# Patient Record
Sex: FEMALE | Race: WHITE | URBAN - METROPOLITAN AREA
[De-identification: names, ages, dates, MRNs, and addresses within clinical notes are randomized per-mention and may not be internally consistent; named-entity substitution may affect disease eponyms.]

---

## 2017-03-01 ENCOUNTER — TELEPHONE (OUTPATIENT)
Dept: INTERNAL MEDICINE CLINIC | Age: 62
End: 2017-03-01

## 2017-04-17 ENCOUNTER — OFFICE VISIT (OUTPATIENT)
Dept: INTERNAL MEDICINE CLINIC | Age: 62
End: 2017-04-17

## 2017-04-17 VITALS
RESPIRATION RATE: 12 BRPM | SYSTOLIC BLOOD PRESSURE: 136 MMHG | HEIGHT: 67 IN | BODY MASS INDEX: 36.88 KG/M2 | DIASTOLIC BLOOD PRESSURE: 86 MMHG | HEART RATE: 96 BPM | WEIGHT: 235 LBS

## 2017-04-17 DIAGNOSIS — L82.1 SEBORRHEIC KERATOSES: ICD-10-CM

## 2017-04-17 DIAGNOSIS — Z80.0 FAMILY HISTORY OF COLON CANCER: ICD-10-CM

## 2017-04-17 DIAGNOSIS — E78.5 HYPERLIPIDEMIA, UNSPECIFIED HYPERLIPIDEMIA TYPE: ICD-10-CM

## 2017-04-17 DIAGNOSIS — Z12.39 SCREENING FOR BREAST CANCER: ICD-10-CM

## 2017-04-17 DIAGNOSIS — D22.9 ATYPICAL NEVI: ICD-10-CM

## 2017-04-17 DIAGNOSIS — R73.01 ELEVATED FASTING BLOOD SUGAR: ICD-10-CM

## 2017-04-17 DIAGNOSIS — R73.09 ELEVATED GLUCOSE: Primary | ICD-10-CM

## 2017-04-17 DIAGNOSIS — K63.5 COLORECTAL POLYPS: ICD-10-CM

## 2017-04-17 DIAGNOSIS — Z00.00 HEALTH CARE MAINTENANCE: ICD-10-CM

## 2017-04-17 DIAGNOSIS — K21.9 GASTROESOPHAGEAL REFLUX DISEASE, ESOPHAGITIS PRESENCE NOT SPECIFIED: ICD-10-CM

## 2017-04-17 DIAGNOSIS — K62.1 COLORECTAL POLYPS: ICD-10-CM

## 2017-04-17 DIAGNOSIS — R31.1 BENIGN MICROSCOPIC HEMATURIA: ICD-10-CM

## 2017-04-17 DIAGNOSIS — Z85.3 HX: BREAST CANCER: ICD-10-CM

## 2017-04-17 DIAGNOSIS — R73.09 ELEVATED GLUCOSE: ICD-10-CM

## 2017-04-17 LAB
A/G RATIO: 1.4 (ref 1.1–2.2)
ALBUMIN SERPL-MCNC: 4 G/DL (ref 3.4–5)
ALP BLD-CCNC: 89 U/L (ref 40–129)
ALT SERPL-CCNC: 11 U/L (ref 10–40)
ANION GAP SERPL CALCULATED.3IONS-SCNC: 17 MMOL/L (ref 3–16)
AST SERPL-CCNC: 12 U/L (ref 15–37)
BASOPHILS ABSOLUTE: 0 K/UL (ref 0–0.2)
BASOPHILS RELATIVE PERCENT: 0.6 %
BILIRUB SERPL-MCNC: 0.3 MG/DL (ref 0–1)
BUN BLDV-MCNC: 16 MG/DL (ref 7–20)
CALCIUM SERPL-MCNC: 9.4 MG/DL (ref 8.3–10.6)
CHLORIDE BLD-SCNC: 100 MMOL/L (ref 99–110)
CHOLESTEROL, TOTAL: 223 MG/DL (ref 0–199)
CO2: 25 MMOL/L (ref 21–32)
CREAT SERPL-MCNC: 0.6 MG/DL (ref 0.6–1.2)
EOSINOPHILS ABSOLUTE: 0.1 K/UL (ref 0–0.6)
EOSINOPHILS RELATIVE PERCENT: 1.8 %
GFR AFRICAN AMERICAN: >60
GFR NON-AFRICAN AMERICAN: >60
GLOBULIN: 2.9 G/DL
GLUCOSE BLD-MCNC: 94 MG/DL (ref 70–99)
HCT VFR BLD CALC: 41.6 % (ref 36–48)
HDLC SERPL-MCNC: 56 MG/DL (ref 40–60)
HEMOGLOBIN: 13.6 G/DL (ref 12–16)
LDL CHOLESTEROL CALCULATED: 157 MG/DL
LYMPHOCYTES ABSOLUTE: 1.3 K/UL (ref 1–5.1)
LYMPHOCYTES RELATIVE PERCENT: 16.5 %
MCH RBC QN AUTO: 28.3 PG (ref 26–34)
MCHC RBC AUTO-ENTMCNC: 32.8 G/DL (ref 31–36)
MCV RBC AUTO: 86.2 FL (ref 80–100)
MONOCYTES ABSOLUTE: 0.4 K/UL (ref 0–1.3)
MONOCYTES RELATIVE PERCENT: 5.5 %
NEUTROPHILS ABSOLUTE: 6.2 K/UL (ref 1.7–7.7)
NEUTROPHILS RELATIVE PERCENT: 75.6 %
PDW BLD-RTO: 14.9 % (ref 12.4–15.4)
PLATELET # BLD: 256 K/UL (ref 135–450)
PMV BLD AUTO: 9.2 FL (ref 5–10.5)
POTASSIUM SERPL-SCNC: 4.3 MMOL/L (ref 3.5–5.1)
RBC # BLD: 4.82 M/UL (ref 4–5.2)
SODIUM BLD-SCNC: 142 MMOL/L (ref 136–145)
TOTAL PROTEIN: 6.9 G/DL (ref 6.4–8.2)
TRIGL SERPL-MCNC: 50 MG/DL (ref 0–150)
TSH SERPL DL<=0.05 MIU/L-ACNC: 2.01 UIU/ML (ref 0.27–4.2)
VLDLC SERPL CALC-MCNC: 10 MG/DL
WBC # BLD: 8.1 K/UL (ref 4–11)

## 2017-04-17 PROCEDURE — 99215 OFFICE O/P EST HI 40 MIN: CPT | Performed by: INTERNAL MEDICINE

## 2017-04-17 PROCEDURE — 90471 IMMUNIZATION ADMIN: CPT | Performed by: INTERNAL MEDICINE

## 2017-04-17 PROCEDURE — 90736 HZV VACCINE LIVE SUBQ: CPT | Performed by: INTERNAL MEDICINE

## 2017-04-18 LAB
ESTIMATED AVERAGE GLUCOSE: 119.8 MG/DL
HBA1C MFR BLD: 5.8 %

## 2017-04-21 ENCOUNTER — TELEPHONE (OUTPATIENT)
Dept: INTERNAL MEDICINE CLINIC | Age: 62
End: 2017-04-21

## 2017-08-17 ENCOUNTER — OFFICE VISIT (OUTPATIENT)
Dept: INTERNAL MEDICINE CLINIC | Age: 62
End: 2017-08-17

## 2017-08-17 VITALS
HEIGHT: 67 IN | HEART RATE: 96 BPM | WEIGHT: 232 LBS | DIASTOLIC BLOOD PRESSURE: 72 MMHG | SYSTOLIC BLOOD PRESSURE: 115 MMHG | BODY MASS INDEX: 36.41 KG/M2 | RESPIRATION RATE: 16 BRPM

## 2017-08-17 DIAGNOSIS — R73.03 PREDIABETES: ICD-10-CM

## 2017-08-17 DIAGNOSIS — Z13.820 SCREENING FOR OSTEOPOROSIS: ICD-10-CM

## 2017-08-17 DIAGNOSIS — Z00.00 ANNUAL PHYSICAL EXAM: Primary | ICD-10-CM

## 2017-08-17 DIAGNOSIS — K63.5 COLORECTAL POLYPS: ICD-10-CM

## 2017-08-17 DIAGNOSIS — Z85.3 HX: BREAST CANCER: ICD-10-CM

## 2017-08-17 DIAGNOSIS — K62.1 COLORECTAL POLYPS: ICD-10-CM

## 2017-08-17 DIAGNOSIS — N39.46 MIXED STRESS AND URGE URINARY INCONTINENCE: ICD-10-CM

## 2017-08-17 PROBLEM — K21.9 GASTROESOPHAGEAL REFLUX DISEASE: Status: RESOLVED | Noted: 2017-04-17 | Resolved: 2017-08-17

## 2017-08-17 PROCEDURE — 99396 PREV VISIT EST AGE 40-64: CPT | Performed by: INTERNAL MEDICINE

## 2017-08-18 ENCOUNTER — TELEPHONE (OUTPATIENT)
Dept: SURGERY | Age: 62
End: 2017-08-18

## 2017-08-18 LAB
A/G RATIO: 1.3 (ref 1.1–2.2)
ALBUMIN SERPL-MCNC: 3.7 G/DL (ref 3.4–5)
ALP BLD-CCNC: 82 U/L (ref 40–129)
ALT SERPL-CCNC: 14 U/L (ref 10–40)
ANION GAP SERPL CALCULATED.3IONS-SCNC: 13 MMOL/L (ref 3–16)
AST SERPL-CCNC: 13 U/L (ref 15–37)
BILIRUB SERPL-MCNC: 0.4 MG/DL (ref 0–1)
BUN BLDV-MCNC: 16 MG/DL (ref 7–20)
CALCIUM SERPL-MCNC: 9.2 MG/DL (ref 8.3–10.6)
CHLORIDE BLD-SCNC: 102 MMOL/L (ref 99–110)
CO2: 26 MMOL/L (ref 21–32)
CREAT SERPL-MCNC: 0.6 MG/DL (ref 0.6–1.2)
ESTIMATED AVERAGE GLUCOSE: 128.4 MG/DL
GFR AFRICAN AMERICAN: >60
GFR NON-AFRICAN AMERICAN: >60
GLOBULIN: 2.9 G/DL
GLUCOSE BLD-MCNC: 107 MG/DL (ref 70–99)
HBA1C MFR BLD: 6.1 %
POTASSIUM SERPL-SCNC: 4.1 MMOL/L (ref 3.5–5.1)
SODIUM BLD-SCNC: 141 MMOL/L (ref 136–145)
TOTAL PROTEIN: 6.6 G/DL (ref 6.4–8.2)

## 2017-08-21 LAB
HPV COMMENT: NORMAL
HPV TYPE 16: NOT DETECTED
HPV TYPE 18: NOT DETECTED
HPVOH (OTHER TYPES): NOT DETECTED

## 2017-08-22 ENCOUNTER — TELEPHONE (OUTPATIENT)
Dept: INTERNAL MEDICINE CLINIC | Age: 62
End: 2017-08-22

## 2017-10-04 ENCOUNTER — OFFICE VISIT (OUTPATIENT)
Dept: INTERNAL MEDICINE CLINIC | Age: 62
End: 2017-10-04

## 2017-10-04 VITALS
OXYGEN SATURATION: 96 % | TEMPERATURE: 98.4 F | WEIGHT: 232 LBS | HEART RATE: 98 BPM | SYSTOLIC BLOOD PRESSURE: 109 MMHG | BODY MASS INDEX: 36.61 KG/M2 | DIASTOLIC BLOOD PRESSURE: 77 MMHG

## 2017-10-04 DIAGNOSIS — H61.22 IMPACTED CERUMEN OF LEFT EAR: ICD-10-CM

## 2017-10-04 DIAGNOSIS — H66.92 LEFT OTITIS MEDIA, UNSPECIFIED CHRONICITY, UNSPECIFIED OTITIS MEDIA TYPE: ICD-10-CM

## 2017-10-04 DIAGNOSIS — H92.02 LEFT EAR PAIN: Primary | ICD-10-CM

## 2017-10-04 DIAGNOSIS — Z23 INFLUENZA VACCINE NEEDED: ICD-10-CM

## 2017-10-04 PROCEDURE — 90686 IIV4 VACC NO PRSV 0.5 ML IM: CPT | Performed by: INTERNAL MEDICINE

## 2017-10-04 PROCEDURE — 99213 OFFICE O/P EST LOW 20 MIN: CPT | Performed by: INTERNAL MEDICINE

## 2017-10-04 PROCEDURE — 90471 IMMUNIZATION ADMIN: CPT | Performed by: INTERNAL MEDICINE

## 2017-10-04 RX ORDER — AMOXICILLIN 500 MG/1
500 CAPSULE ORAL 3 TIMES DAILY
Qty: 30 CAPSULE | Refills: 0 | Status: SHIPPED | OUTPATIENT
Start: 2017-10-04 | End: 2017-10-14

## 2017-10-04 ASSESSMENT — PATIENT HEALTH QUESTIONNAIRE - PHQ9
SUM OF ALL RESPONSES TO PHQ9 QUESTIONS 1 & 2: 0
1. LITTLE INTEREST OR PLEASURE IN DOING THINGS: 0
2. FEELING DOWN, DEPRESSED OR HOPELESS: 0
SUM OF ALL RESPONSES TO PHQ QUESTIONS 1-9: 0

## 2017-10-04 NOTE — PROGRESS NOTES
Vaccine Information Sheet, \"Influenza - Inactivated\"  given to Jany Beckwith, or parent/legal guardian of  Jany Beckwith and verbalized understanding. Patient responses:    Have you ever had a reaction to a flu vaccine? No  Are you able to eat eggs without adverse effects? Yes  Do you have any current illness? No  Have you ever had [place] Syndrome? No    Flu vaccine given per order. Please see immunization tab.

## 2017-10-04 NOTE — PATIENT INSTRUCTIONS
Use sudafed for congestion. Take the antibiotics      Earache: Care Instructions  Your Care Instructions  Even though infection is a common cause of ear pain, not all ear pain means an infection. If you have ear pain and don't have an infection, it could be because of a jaw problem, such as temporomandibular joint (TMJ) pain. Or it could be because of a neck problem. When ear discomfort or pain is mild or comes and goes without other symptoms, home treatment may be all you need. Follow-up care is a key part of your treatment and safety. Be sure to make and go to all appointments, and call your doctor if you are having problems. It's also a good idea to know your test results and keep a list of the medicines you take. How can you care for yourself at home? · Apply heat on the ear to ease pain. To apply heat, put a warm water bottle, a heating pad set on low, or a warm cloth on your ear. Do not go to sleep with a heating pad on your skin. · Take an over-the-counter pain medicine, such as acetaminophen (Tylenol), ibuprofen (Advil, Motrin), or naproxen (Aleve). Be safe with medicines. Read and follow all instructions on the label. · Do not take two or more pain medicines at the same time unless the doctor told you to. Many pain medicines have acetaminophen, which is Tylenol. Too much acetaminophen (Tylenol) can be harmful. · Never insert anything, such as a cotton swab or a ericka pin, into the ear. When should you call for help? Call your doctor now or seek immediate medical care if:  · You have new or worse symptoms of infection, such as:  ¨ Increased pain, swelling, warmth, or redness. ¨ Red streaks leading from the area. ¨ Pus draining from the area. ¨ A fever. Watch closely for changes in your health, and be sure to contact your doctor if:  · You have new or worse discharge coming from the ear. · You do not get better as expected. Where can you learn more?   Go to thoroughly with a hair dryer set on low. Hold the dryer several inches from your ear. ¨ If the warm mineral oil and shower do not work, use an over-the-counter wax softener followed by gentle flushing with an ear syringe each night for a week or two. Make sure the flushing solution is body temperature. Cool or hot fluids in the ear can cause dizziness. When should you call for help? Call your doctor now or seek immediate medical care if:  · Pus or blood drains from your ear. · Your ears are ringing or feel full. · You have a loss of hearing. Watch closely for changes in your health, and be sure to contact your doctor if:  · You have pain or reduced hearing after 1 week of home treatment. · You have any new symptoms, such as nausea or balance problems. Where can you learn more? Go to https://[email or web address] [email or web address] org and sign in to your Intent Media account. Enter W722 in the Viadeo box to learn more about \"Earwax Blockage: Care Instructions. \"     If you do not have an account, please click on the \"Sign Up Now\" link. Current as of: March 20, 2017  Content Version: 11.3  © 8028-8525 [employer], Bringme. Care instructions adapted under license by [facility] ([facility]). If you have questions about a medical condition or this instruction, always ask your healthcare professional. [place] [address] any warranty or liability for your use of this information.

## 2017-10-04 NOTE — PROGRESS NOTES
Bethany Stephenson   YOB: 1955    Date of Visit:  10/4/2017    CC:  Left ear pain     No Known Allergies    No outpatient prescriptions have been marked as taking for the 10/4/17 encounter (Office Visit) with Susan Kumar MD.       [facility] Readings from Last 3 Encounters:   10/04/17 232 lb (105.2 kg)   08/17/17 232 lb (105.2 kg)   04/17/17 235 lb (106.6 kg)     BP Readings from Last 3 Encounters:   10/04/17 109/77   08/17/17 115/72   04/17/17 136/86       HPI: Patient complains of 4 day(s) history of ear pain: on the left. Symptoms have been unchanged with time. She denies any other symptoms . Relevant PMH: No pertinent PMH. Smoking history:  She  reports that she has never smoked. She has never used smokeless tobacco. She has had no known ill contacts. Treatment to date: none. Would not have come in except is flying to [facility] in 5 days    ROS:  As documented in HPI    PHYSICAL EXAM:  Vitals:    10/04/17 1139   BP: 109/77   Pulse: 98   Temp: [id number] °F (36.9 °C)   TempSrc: Oral   SpO2: 96%   Weight: 232 lb (105.2 kg)     General:  appears well-developed and well-nourished, in no apparent distress  Skin:  normal   Eyes:  normal    ENT:  right tympanic membrane normal, right external ear normal, left ear , left cerumen impaction noted, oropharynx clear and moist with normal mucous membranes and sinuses non-tender    Lymph nodes: no palpable lymphadenopathy  Pulmonary: clear to auscultation bilaterally- no wheezes, rales or rhonchi, normal air movement, no respiratory distress  Neurological:  alert and oriented to person, place, and time  Psychiatric:  behavior, cognition and memory grossly normal    ASSESSMENT:   Clinical picture is most consistent with a diagnosis of cerumen impaction, poss OM, eustacian tube dysfunction.     PLAN:   · decongestants PRN, karen before flight, amoxicillin 500 TID

## 2017-10-04 NOTE — MR AVS SNAPSHOT
After Visit Summary             Bethany Stephenson   10/4/2017 11:30 AM   Office Visit    Description:  Female : 1955   Provider:  Susan Kumar MD   Department:  [address] [place] [address] Primary Care              Your Follow-Up and Future Appointments         Below is a list of your follow-up and future appointments. This may not be a complete list as you may have made appointments directly with providers that we are not aware of or your providers may have made some for you. Please call your providers to confirm appointments. It is important to keep your appointments. Please bring your current insurance card, photo ID, co-pay, and all medication bottles to your appointment. If self-pay, payment is expected at the time of service. Your To-Do List     Future Appointments Provider Department Dept Phone    2017 11:00 AM Eduarda Stone MD [facility] Dermatology [phone]    Please arrive 15 minutes prior to scheduled appointment time to complete paper work, bring photo ID and insurance cards. 2018 9:00 AM Susan Kumar MD [address] [place] [address] Primary Care [phone]    Please arrive 15 minutes prior to appointment, bring photo ID and insurance card. Information from Your Visit        Department     Name Address Phone Fax    [facility] ([facility]) Physicians [facility] [address] [place] [address] [phone]      You Were Seen for:         Comments    Left ear pain   [[id number]]         Vital Signs     Blood Pressure Pulse Temperature Weight Oxygen Saturation Body Mass Index    109/77 98 [id number] °F (36.9 °C) (Oral) 232 lb (105.2 kg) 96% 36.61 kg/m2    Smoking Status                   Never Smoker           Additional Information about your Body Mass Index (BMI)           Your BMI as listed above is considered obese (30 or more).  BMI is an estimate of body fat, calculated from your height and weight. The higher your BMI, the greater your risk of heart disease, high blood pressure, type 2 diabetes, stroke, gallstones, arthritis, sleep apnea, and certain cancers. BMI is not perfect. It may overestimate body fat in athletes and people who are more muscular. Even a small weight loss (between 5 and 10 percent of your current weight) by decreasing your calorie intake and becoming more physically active will help lower your risk of developing or worsening diseases associated with obesity. Learn more at: Domin-[employer] Enterprise Solutions.uk          Instructions    Use sudafed for congestion. Take the antibiotics      Earache: Care Instructions  Your Care Instructions  Even though infection is a common cause of ear pain, not all ear pain means an infection. If you have ear pain and don't have an infection, it could be because of a jaw problem, such as temporomandibular joint (TMJ) pain. Or it could be because of a neck problem. When ear discomfort or pain is mild or comes and goes without other symptoms, home treatment may be all you need. Follow-up care is a key part of your treatment and safety. Be sure to make and go to all appointments, and call your doctor if you are having problems. It's also a good idea to know your test results and keep a list of the medicines you take. How can you care for yourself at home? · Apply heat on the ear to ease pain. To apply heat, put a warm water bottle, a heating pad set on low, or a warm cloth on your ear. Do not go to sleep with a heating pad on your skin. · Take an over-the-counter pain medicine, such as acetaminophen (Tylenol), ibuprofen (Advil, Motrin), or naproxen (Aleve). Be safe with medicines. Read and follow all instructions on the label. · Do not take two or more pain medicines at the same time unless the doctor told you to.  Many pain medicines have acetaminophen, which is Tylenol. Too much acetaminophen (Tylenol) can be harmful. · Never insert anything, such as a cotton swab or a ericka pin, into the ear. When should you call for help? Call your doctor now or seek immediate medical care if:  · You have new or worse symptoms of infection, such as:  ¨ Increased pain, swelling, warmth, or redness. ¨ Red streaks leading from the area. ¨ Pus draining from the area. ¨ A fever. Watch closely for changes in your health, and be sure to contact your doctor if:  · You have new or worse discharge coming from the ear. · You do not get better as expected. Where can you learn more? Go to https://SideTourpeMindStorm [email or web address].Selftrade. org and sign in to your Min[employer]er [employer] account. Enter A173 in the Neolane box to learn more about \"Earache: Care Instructions. \"     If you do not have an account, please click on the \"Sign Up Now\" link. Current as of: July 29, 2016  Content Version: 11.3  © 5136-9939 Kwestr. Care instructions adapted under license by [facility] ([facility]). If you have questions about a medical condition or this instruction, always ask your healthcare professional. [place] [address] any warranty or liability for your use of this information. Earwax Blockage: Care Instructions  Your Care Instructions    Earwax is a natural substance that protects the ear canal. Normally, earwax drains from the ears and does not cause problems. Sometimes earwax builds up and hardens. Earwax blockage (also called cerumen impaction) can cause some loss of hearing and pain. When wax is tightly packed, you will need to have your doctor remove it. Follow-up care is a key part of your treatment and safety. Be sure to make and go to all appointments, and call your doctor if you are having problems. Its also a good idea to know your test results and keep a list of the medicines you take. How can you care for yourself at home? · Do not try to remove earwax with cotton swabs, fingers, or other objects. This can make the blockage worse and damage the eardrum. · If your doctor recommends that you try to remove earwax at home:  ¨ Soften and loosen the earwax with warm mineral oil. You also can try hydrogen peroxide mixed with an equal amount of room temperature water. Place 2 drops of the fluid, warmed to body temperature, in the ear two times a day for up to 5 days. ¨ Once the wax is loose and soft, all that is usually needed to remove it from the ear canal is a gentle, warm shower. Direct the water into the ear, then tip your head to let the earwax drain out. Dry your ear thoroughly with a hair dryer set on low. Hold the dryer several inches from your ear. ¨ If the warm mineral oil and shower do not work, use an over-the-counter wax softener followed by gentle flushing with an ear syringe each night for a week or two. Make sure the flushing solution is body temperature. Cool or hot fluids in the ear can cause dizziness. When should you call for help? Call your doctor now or seek immediate medical care if:  · Pus or blood drains from your ear. · Your ears are ringing or feel full. · You have a loss of hearing. Watch closely for changes in your health, and be sure to contact your doctor if:  · You have pain or reduced hearing after 1 week of home treatment. · You have any new symptoms, such as nausea or balance problems. Where can you learn more? Go to https://Freed Foods.ADOMIC (formerly YieldMetrics). org and sign in to your [email or web address] account. Enter W208 in the [facility] box to learn more about \"Earwax Blockage: Care Instructions. \"     If you do not have an account, please click on the \"Sign Up Now\" link. Current as of: March 20, 2017  Content Version: 11.3  © 1973-1108 Ornicept, Incorporated. Care instructions adapted under license by [facility] ([facility]).  If you have questions about a medical condition Date Due    Yearly Flu Vaccine (1) 9/1/2017    Mammograms are recommended every 2 years for low/average risk patients aged 48 - 69, and every year for high risk patients per updated national guidelines. However these guidelines can be individualized by your provider. 8/4/2018    Diabetes Screening 8/17/2020    Colonoscopy 9/9/2020    Cholesterol Screening 4/17/2022    Pap Smear 8/17/2022    Tetanus Combination Vaccine (2 - Td) 11/17/2025            Merklet Signup           Our records indicate that you have an active TripleTree account. You can view your After Visit Summary by going to [email or web address] and logging in with your TripleTree username and password. If you don't have a TripleTree username and password but a parent or guardian has access to your record, the parent or guardian should login with their own TripleTree username and password and access your record to view the After Visit Summary. Additional Information  If you have questions, please contact the physician practice where you receive care. Remember, TripleTree is NOT to be used for urgent needs. For medical emergencies, dial 911. For questions regarding your TripleTree account call [phone]. If you have a clinical question, please call your doctor's office.

## 2017-11-10 DIAGNOSIS — Z12.11 ENCOUNTER FOR SCREENING COLONOSCOPY: Primary | ICD-10-CM

## 2017-11-10 NOTE — LETTER
During the procedure, the doctor can remove polyps, which can help decrease the risk of developing colon and rectal cancer. Tissue sampling (biopsy) can also be performed to help diagnose other conditions of the colon and rectum. How is Colonoscopy Done? This procedure is done in the Endoscopy Unit at the hospital. Please arrive 1.5 hours before the procedure. Please see below for directions to the Hospital Endoscopy Unit. After you arrive and register, an IV will be placed. An anesthesia provider will give you medicine to help you relax and not feel pain. Most people do not remember having the test because of the sedation medication. The doctor gently moves the colonoscope instrument through the colon and fills it with air. There is a small video camera on the tip that allows the doctor to see the colon and take pictures. The procedure usually takes 30 to 45 minutes. It may take longer if the doctor has to remove polyps. Scheduling Your Colonoscopy    Your colonoscopy will typically be scheduled weeks to months in advance. The prep information will be communicated to you via telephone, mail, or email, depending on your preference. Please make sure you have a working telephone number and email address in our computer system, as the office will communicate with you as a courtesy 1-2 days before your procedure, as well as 3-5 days afterward with any biopsy results. If you need to cancel or reschedule, please do so at least 2 weeks before the procedure, so that we can be considerate to other patients who are waiting to be scheduled. If you cancel or reschedule less than 7 days before your procedure, you will be placed on a lower priority list and may be charged a cancellation fee. How Do You Prepare for the Procedure? · Understand exactly what procedure is planned, along with the risks, benefits, and other options. · Tell your doctors ALL the medicines, vitamins, supplements, and herbal remedies you take. Some of these can increase the risk of bleeding or interact with anesthesia. · If you take blood thinners, such as warfarin (Coumadin), clopidogrel (Plavix), or aspirin, be sure to talk to your doctor. He or she will tell you if you should stop taking these medicines before your procedure. Make sure that you understand exactly what your doctor wants you to do. · Your doctor will tell you which medicines to take or stop before your procedure. You may need to stop taking certain medicines a week or more before the procedure. So talk to your doctor as soon as you can. · If you have an advance directive, let your doctor know. It may include a living will and a durable power of  for health care. Bring a copy to the hospital. If you don't have one, you may want to prepare one. It lets your doctor and loved ones know your health care wishes. Doctors advise that everyone prepare these papers before any type of surgery or procedure. · The night before and morning of the procedure, you drink a large amount of a special liquid. This causes loose, frequent stools. You will go to the bathroom a lot. It is very important to drink all of the colon prep liquid. PLEASE READ AND COMPLETELY FOLLOW THE PREP INFORMATION BELOW    7 days before your Colonoscopy  ? If you are taking blood thinners such as Plavix please talk to your prescribing Doctor about stopping it 7 days before your procedure. We will get clearance from your Doctor, do not stop until you get the ok.  ? Stop taking any iron supplements including vitamins with iron, fish oil, or Vitamin D.  5 days before your Colonoscopy  ? If you are taking blood thinners such as Coumadin, Warfarin, Aspirin, Pletal, or Aggrenox please talk to your prescribing Doctor about stopping it 5 days before your procedure.  We will get clearance from your Doctor, do not stop until you get the ok      The Day Before your Colonoscopy  · DO NOT EAT SOLID FOOD THE DAY BEFORE YOUR PROCEDURE.   · DRINK ONLY CLEAR LIQUIDS THE DAY BEFORE YOUR PROCEDURE:  · You can have water, tea, coffee (without cream), clear juices without pulp, soda, clear broths, hard candy, flavored ice pops, Ensure/Boost drinks, and gelatin (such as Jell-O). Do not drink anything red or purple, such as grape juice or fruit punch. · Drink at least 8oz of clear liquids every hour throughout the day to keep you hydrated. · In the morning- mix the Golytely solution (this was sent to your pharmacy) and place in the refrigerator. · At 4pm-Drink an 8oz glass of GoLytely every 10-20 min until ½ of the bottle is empty. If you become nauseated, take a rest and then be sure to finish half of the bottle. ? If you are diabetic please talk to your Doctor about your Insulin instructions. The Day of your Colonoscopy  · 6 hours before your procedure- Drink an 8oz glass of GoLytely every 10-20 min until the bottle is completely gone. · Stop drinking clear liquids 3 hours before the test.  · The morning of the procedure- You may take any heart or blood pressure medications with a small sip of water. · You must have someone WITH YOU to drive you home after the procedure. · PLEASE ARRIVE AT [place] [address] 1.5 HOURS BEFORE THE START OF THE PROCEDURE  · Please bring a list of medications, any inhalers, photo ID and insurance cards with you. If you have a pacemaker or defibrillator, please inform your nurse. · Please call [phone] with questions    For many people, the prep is worse than the procedure itself. It may be uncomfortable, and you may feel hungry on the clear liquid diet. Some people do not go to work or do not do their usual activities on the day of the prep. Arrange to have someone take you home after the procedure. What can you expect after a colonoscopy? The nurses will watch you in the recovery area for 1 to 2 hours until the medicines wear off. Then you can go home. You will need a ride. You can resume a normal diet after the procedure. You are legally not allowed to drive or operate machinery after the procedure, as you will have received sedation. Do not plan on going back to work that day. Your doctor will talk to you about when you will need your next colonoscopy. This will depend on the results of the colonoscopy and your medical and family history. Complications:    Colonoscopy is generally a very safe procedure with low complication risk. Common complaints after the procedure include bloating and excessive flatulence, and occasionally nausea. This is normal.     Other complications include:  · Anesthesia/sedation issues  · Bleeding, especially if polyps are removed (uncommon)  · Most bleeding will stop on its own, but occasionally can require a repeat colonoscopy or hospital admission  · This risk is slightly higher in patients on blood thinners. Ask your doctor about any blood thinning medications that you take   · Perforation, or a hole in the colon, which can require hospital admission or emergency surgery (very rare)    Call the office [phone] or go to your nearest emergency room if you have fever greater than 101º, severe abdominal pain that does not get better after a few hours, or rectal bleeding that does not stop after a few hours. You may also call the office with any non-emergency questions or concerns. Follow-up care is a key part of your treatment and safety:    If polyps or other abnormalities are found, tissue samples will be sent to the pathology lab to be examined. Results are usually available in 3-5 business days. My office typically calls with results. If you do not hear from us, please CALL US and ask for your results: [phone].  Sometimes these results will determine when your next colonoscopy is recommended. Be sure to communicate with your primary care physician about the results of your colonoscopy.     YOU ARE SCHEDULED FOR COLONOSCOPY WITH DR Milad Lima    Date: 12/6/17    Time: 10:30AM    Please arrive at the Endoscopy Unit at: 9:00AM

## 2017-12-01 ENCOUNTER — TELEPHONE (OUTPATIENT)
Dept: SURGERY | Age: 62
End: 2017-12-01

## 2017-12-01 NOTE — TELEPHONE ENCOUNTER
Patient called cancelling Colonoscopy for 12/6/17, She was rescheduled with Dr Tenisha Moise on 01/4/18 @ 12:00PM @ [facility].

## 2017-12-07 DIAGNOSIS — Z12.11 SCREENING FOR COLON CANCER: Primary | ICD-10-CM

## 2017-12-07 RX ORDER — PEG-3350, SODIUM SULFATE, SODIUM CHLORIDE, POTASSIUM CHLORIDE, SODIUM ASCORBATE AND ASCORBIC ACID 7.5-2.691G
KIT ORAL
Qty: 1 EACH | Refills: 0 | Status: SHIPPED | OUTPATIENT
Start: 2017-12-07 | End: 2018-01-22 | Stop reason: ALTCHOICE

## 2017-12-07 NOTE — LETTER
COLONOSCOPY PREP INSTRUCTIONS  Graciela     Your colonoscopy is scheduled on: _1/4/18__   Dr. Gaming____  Arrival Time: _10:30 AM___   's Name_____Laine___ [phone]    Keep these papers together; REVIEW ALL OF THEM AT LEAST 7 DAYS BEFORE THE PROCEDURE. Please complete all paperwork; including a current list of your medications, to avoid delays in the admission process. The following instructions must be followed in order to ensure your procedure has optimal outcomes. - KEEP YOUR APPOINTMENT. If for any reason, you are unable to keep your appointment, please notify us at [phone] within 72 hours before your procedure. - You MUST have a responsible adult to drive you, who MUST remain at our facility the ENTIRE time. If not the procedure will be cancelled. You may go by taxi ONLY if you have a responsible adult with you. You may experience light headedness, dizziness etc., therefore you should have a responsible adult remain with you until the morning after your procedure. - Bring your insurance card and 's license. Call your insurance carrier to verify your benefits, and confirm that our facility is in your network, prior to the procedure date to ensure coverage.   - Due to the safety and privacy of our patients, only one visitor is allowed in the recovery area after the procedure. The center will not be responsible for lost valuables so please leave them at home. - Try to avoid seeds (strawberries, baljit, and rye) for one week prior to your procedure. - If you have questions after beginning the prep, call [phone] between 8:30 am & 5:00pm you will speak to a nurse or medical assistant, after 5:00pm you will reach the physician on call. - Hydration (body fluid) is the most important aspect of an effective and safe prep. If you are not drinking enough fluid you may experience cramping, nausea and dizziness. - Common side effects of the prep are nausea, bloating and shaking chills. If any of these occur, take a break from the prep (30 minutes to 1 hour) and then restart. If unable to complete after that notify the Physician as your procedure may need to be cancelled. Nausea medication or an alternative prep is sometimes called in.  - Do not leave home after starting the prep. Frequent, liquid stools may begin as soon as 15 minutes after the first bottle, although it could take up to 4 hours or longer for your first bowel movement. - Even if your stools are clear and watery in appearance, TAKE ALL OF THE PREP.  - Using baby wipes and nonprescription ointments, such as Desitin, will help with the irritation caused by frequent loose stools. - Due to unforeseen schedule changes, you may be asked to move your appointment time to an earlier/later time slot. - If you are scheduled at the hospital with anesthesia you will need to discontinue all liquids even water 4 hours prior to your procedure. To insure that your prep gives you the best results for your Colonoscopy, Please follow all directions closely. 3-5 days prior to your procedure:  1. Begin a low-fiber diet (no corn, dried beans, tomatoes, nuts, seeds, lettuce). 2. No bran or bulking agents. 3. Stop taking your multivitamin or iron supplements. 4. If you currently take Aggrenox, Dipyridamole, Persantine, Fondaparinux sodium (Arixtra), Dalteparin sodium Karen Relic), Warfarin (Coumadin), Clopidogrel (Plavix), Prasugrel (Effient), Enoxaparin, Lovenox, or Heparin, Cilostazol (Pletal), Rivoroxaban (Xarelto), Desirudin (Iprivask), Cyclopentyltrazolopyrimide (Ticagrelor or Brilinta), Cangrelor (Tho Lukes), Dabigatran (Pradaxa), Apixaban (Eliquis), Edoxaban (Savaysa) you should have received instructions regarding if and when to discontinue the medication.  If you have not, or do not clearly understand the

## 2018-01-02 ENCOUNTER — TELEPHONE (OUTPATIENT)
Dept: SURGERY | Age: 63
End: 2018-01-02

## 2018-01-03 ENCOUNTER — TELEPHONE (OUTPATIENT)
Dept: SURGERY | Age: 63
End: 2018-01-03

## 2018-01-22 ENCOUNTER — OFFICE VISIT (OUTPATIENT)
Dept: INTERNAL MEDICINE CLINIC | Age: 63
End: 2018-01-22

## 2018-01-22 VITALS
SYSTOLIC BLOOD PRESSURE: 120 MMHG | DIASTOLIC BLOOD PRESSURE: 80 MMHG | HEART RATE: 99 BPM | WEIGHT: 229 LBS | BODY MASS INDEX: 36.14 KG/M2 | OXYGEN SATURATION: 98 %

## 2018-01-22 DIAGNOSIS — R73.01 ELEVATED FASTING BLOOD SUGAR: ICD-10-CM

## 2018-01-22 DIAGNOSIS — Z85.3 HX: BREAST CANCER: ICD-10-CM

## 2018-01-22 DIAGNOSIS — E78.5 HYPERLIPIDEMIA, UNSPECIFIED HYPERLIPIDEMIA TYPE: ICD-10-CM

## 2018-01-22 DIAGNOSIS — N39.46 MIXED STRESS AND URGE URINARY INCONTINENCE: ICD-10-CM

## 2018-01-22 DIAGNOSIS — K62.1 COLORECTAL POLYPS: ICD-10-CM

## 2018-01-22 DIAGNOSIS — K21.9 GASTROESOPHAGEAL REFLUX DISEASE, ESOPHAGITIS PRESENCE NOT SPECIFIED: ICD-10-CM

## 2018-01-22 DIAGNOSIS — K63.5 COLORECTAL POLYPS: ICD-10-CM

## 2018-01-22 DIAGNOSIS — R73.03 PREDIABETES: Primary | ICD-10-CM

## 2018-01-22 PROCEDURE — 99214 OFFICE O/P EST MOD 30 MIN: CPT | Performed by: INTERNAL MEDICINE

## 2018-01-22 ASSESSMENT — ENCOUNTER SYMPTOMS
COUGH: 0
WHEEZING: 0
CONSTIPATION: 0
VOMITING: 0
DIARRHEA: 0
HEMOPTYSIS: 0
SPUTUM PRODUCTION: 0
ABDOMINAL PAIN: 0
ORTHOPNEA: 0
SHORTNESS OF BREATH: 0
NAUSEA: 0
BLOOD IN STOOL: 0

## 2018-01-22 NOTE — Clinical Note
I think she will be fine with Dr Mindi Cortes -she asked to see Dr Levi Delacruz or Judit Tolbert- prefers woman Please advise her to schedule with her in July- to establish

## 2018-01-22 NOTE — Clinical Note
No RX now.  Follow up in 6 months for physical Are either of you able to accommodate her - requests female MD

## 2018-01-22 NOTE — PATIENT INSTRUCTIONS
you probably need more sleep. Another sign of not getting enough sleep is feeling tired during the day. The average total nightly sleep time is 7½ to 8 hours. Healthy adults may need a little more or a little less than this. Why is getting enough sleep important? Getting enough quality sleep is a basic part of good health. When your sleep suffers, your mood and your thoughts can suffer too. You may find yourself feeling more grumpy or stressed. Not getting enough sleep also can lead to serious problems, including injury, accidents, anxiety, and depression. What might cause poor sleeping? Many things can cause sleep problems, including:  · Stress. Stress can be caused by fear about a single event, such as giving a speech. Or you may have ongoing stress, such as worry about work or school. · Depression, anxiety, and other mental or emotional conditions. · Changes in your sleep habits or surroundings. This includes changes that happen where you sleep, such as noise, light, or sleeping in a different bed. It also includes changes in your sleep pattern, such as having jet lag or working a late shift. · Health problems, such as pain, breathing problems, and restless legs syndrome. · Lack of regular exercise. How can you help yourself? Here are some tips that may help you sleep more soundly and wake up feeling more refreshed. Your sleeping area  · Use your bedroom only for sleeping and sex. A bit of light reading may help you fall asleep. But if it doesn't, do your reading elsewhere in the house. Don't watch TV in bed. · Be sure your bed is big enough to stretch out comfortably, especially if you have a sleep partner. · Keep your bedroom quiet, dark, and cool. Use curtains, blinds, or a sleep mask to block out light. To block out noise, use earplugs, soothing music, or a \"white noise\" machine. Your evening and bedtime routine  · Create a relaxing bedtime routine.  You might want to take a warm shower or bath,

## 2018-01-22 NOTE — PROGRESS NOTES
[facility]- Internal Medicine  Progress Note  Bandar Love. Kyra Rowan MD, MPH     Assessment/Plan    Melissa Harris was seen today for 6 month follow-up. Diagnoses and all orders for this visit:  Prediabetes  Reports  at work earlier this year. A1C 6.1 last year  Discussed diet changes ( she likes her carbs, works on low calorie foods) and increased exercise  CMP,A1C  Info on pre DM provided    Hyperlipidemia  She understands that she will need to be on a statin in the near future  Cont lifestyle modifications    HX: breast cancer  mammo up to date  Will schedule annual follow up with Dr Subhash Hernandez    Mixed stress and urge urinary incontinence  Symptoms persist  Ref to Dr Fritz Fajardo    Gastroesophageal reflux disease  reviewed diet changes/ lifestyle modifications      30  minutes were spent with patient . Greater than 50% continuity of care or counseling. Discussed medications with patient, who voiced understanding of their use and indications. All questions answered. Return in about 6 months (around 7/22/2018) for physical, annual.                 Shannon Meadows   YOB: 1955    Date of Visit:  1/22/2018    No Known Allergies  No outpatient prescriptions have been marked as taking for the 1/22/18 encounter (Office Visit) with Chet Bundy MD.       Chief Complaint   Patient presents with    6 Month Follow-Up   per MA triage  HPI  Pt is here for follow up of chronic medical problems: preDM, lipids,   URI resolving  Treatment Adherence:   Medication compliance:  compliant all of the time  Diet compliance:  compliant most of the time- low dave diet. No so strong on fresh fruits and   Weight trend: decreasing  Current exercise: walks 1 time(s) per day  Barriers: weather. Has not seen Dr Subhash Hernandez for 2 years.  No longer on tamoxifen  Is in process of rescheduling c scope she had polyps and her Dad had Colon ca    Pre Diabetes Mellitus Type 2: Current symptoms/problems include Musculoskeletal: She exhibits no edema. Neurological: She is alert and oriented to person, place, and time. Skin: Skin is warm and dry. Psychiatric: She has a normal mood and affect.

## 2018-01-23 ENCOUNTER — TELEPHONE (OUTPATIENT)
Dept: INTERNAL MEDICINE CLINIC | Age: 63
End: 2018-01-23

## 2018-02-13 ENCOUNTER — TELEPHONE (OUTPATIENT)
Dept: INTERNAL MEDICINE CLINIC | Age: 63
End: 2018-02-13

## 2018-03-01 ENCOUNTER — OFFICE VISIT (OUTPATIENT)
Dept: DERMATOLOGY | Age: 63
End: 2018-03-01

## 2018-03-01 DIAGNOSIS — Z78.9 NON-TOBACCO USER: ICD-10-CM

## 2018-03-01 DIAGNOSIS — L57.8 PHOTOAGING OF SKIN: ICD-10-CM

## 2018-03-01 DIAGNOSIS — L57.0 ACTINIC KERATOSIS: Primary | ICD-10-CM

## 2018-03-01 DIAGNOSIS — L82.1 SEBORRHEIC KERATOSES: ICD-10-CM

## 2018-03-01 PROCEDURE — 99242 OFF/OP CONSLTJ NEW/EST SF 20: CPT | Performed by: DERMATOLOGY

## 2018-03-01 PROCEDURE — 17000 DESTRUCT PREMALG LESION: CPT | Performed by: DERMATOLOGY

## 2018-03-01 NOTE — PATIENT INSTRUCTIONS
Sun Protection Tips    · Apply broad spectrum water resistant sunscreen with an SPF of at least 30 to exposed areas of the skin. Dont forget the ears and lips! Remember to reapply sunscreen about every 2 hours and after swimming or sweating. · Wear sun protective clothing. Swim shirts (aka. rash guards) are a great idea and negates the need to reapply sunscreen in those areas. · Seek the shade whenever possible especially between the hours of 10am and 4pm when the suns rays are the strongest.     · Avoid tanning beds    Seborrheic keratosis    Educational Overview:  Seborrheic keratosis (seb-o-REE-ik care-uh-TOE-sis) is a common benign, or harmless, skin growth that affect people over the age of 27. They are not cancer and do not increase the risk of developing skin cancer. The exact cause is unknown but the tendency to develop SKs seems to be inherited. Almost all adults develop one or more seborrheic keratoses (SKs) and some people may develop many. Some growths may have a warty surface while others look like dabs of warm, brown candle wax on the skin. Seborrheic keratoses range in color from white to black; however, most are tan or brown. You can find these harmless growths anywhere on the skin, except the palms and soles. Most often, youll see them on the chest, back, head, or neck. The condition is more likely with advancing age, and the number of growths often increases over the years. Seborrheic keratoses are not contagious. Because of the benign nature of seborrheic keratoses, they can be left untreated if they are non-problematic  In cases where SKs are consistently irritated with shaving, itch or bleed excessively, enlarge, become irritated by clothing or other sources of contact, or are cosmetically undesirable, please contact your Dermatologist for evaluation and removal recommendations.      Ref: American Academy of Dermatology     Wash face morning and night with Cetaphil  [place]

## 2018-03-01 NOTE — PROGRESS NOTES
[facility]) Dermatology  [place], [address]       Abraham Martin  1955    57 y.o. female     Date of Visit: 3/1/2018    Chief Complaint:   Chief Complaint   Patient presents with    New Patient     Mole on back (been there for 6-8 months) and under right eye ( been there maybe 6 mos.) , spots on left temple (8 mos. ). Fam hx of skin cancer        I was asked to see this patient by Dr. Caroline Miller. History of Present Illness:  Abraham Martin is a 57 y.o. female who presents with the chief complaint of establish care and for the followin. Does have scaly spot located on left cheek that feel dry and irritated at times. It does not resolve on their own and has been present for several months. No prior treatment to lesion. Declined TBSE today. 2. Several year history of persistent increasing in number asymptomatic brown rough bumps located on right cheek, left temple (both present for 6-8 months) and back. No change in size, shape, or color. Admits to sun exposure in youth without wearing sunscreen, hats, or protective clothing. Wears sunscreen and hats regularly when outdoors currently. Review of Systems:  Constitutional: Reports general sense of well-being   Skin: No new or changing moles, no history of keloids or hypertrophic scars. Heme: No abnormal bruising or bleeding. Past Medical History, Family History, Surgical History, Medications and Allergies reviewed. Past Skin Hx:   Patient denies past history of melanoma, NMSC, dysplastic nevi, or chronic skin rashes. PFHx: Father-skin cancer, unsure what type    Family History   Problem Relation Age of Onset    Alcohol Abuse Mother     Colon Cancer Father      mets to lung    Cancer Father      skin cancer-unknown type     Past Medical History:   Diagnosis Date    Breast cancer, stage 2 ([facility] [address]     dx , S/P rt. breast lumpectomy.  S/P chemo/radiation    Colorectal polyps     DVT of upper extremity (deep vein thrombosis) ([facility] [address] 2000    Left Arm secondary to port for chemo    Gastroesophageal reflux disease 4/17/2017    Gestational diabetes     during pregnancy     Horseshoe kidney     HTN     during pregnancy     Past Surgical History:   Procedure Laterality Date    APPENDECTOMY      age 8    BREAST BIOPSY  2000    left was benign, right was positive    BREAST LUMPECTOMY  2000    right w/ lymph node dissection    FERTILITY SURGERY      ivf, 17-19 years ago, multiple     TONSILLECTOMY      age 23       No Known Allergies  No outpatient prescriptions have been marked as taking for the 3/1/18 encounter (Office Visit) with Janie Shames, DO. Social History:   Social History     Social History    Marital status:      Spouse name: N/A    Number of children: 1    Years of education: [address]     Occupational History          insurance co- [employer]     Social History Main Topics    Smoking status: Never Smoker    Smokeless tobacco: Never Used    Alcohol use No    Drug use: No    Sexual activity: No     Other Topics Concern    Not on file     Social History Narrative    Lives in [place] in own home with spouse       Physical Examination     The following were examined and determined to be normal: Psych/Neuro, Conjunctivae/eyelids, Gums/teeth/lips, Neck and Back. The following were examined and determined to be abnormal: Head/face. -General: NAD, well-nourished, well-developed. Areas of skin examined as listed above:   1. ill defined irreg shaped gritty keratotic pink macule(s) located to left malar cheek  2. stuck-on appearing tan-brown verrucous papules located to face diffusely, back  3. Face, neck, -Scattered dyspigmentation with multiple lentigines and vascular change consistent with chronic sun exposure    Assessment and Plan     1. Actinic keratosis    2. Seborrheic keratoses    3. Photoaging of skin    4. Non-tobacco user        1.  Actinic keratosis  Actinic

## 2018-06-01 ENCOUNTER — OFFICE VISIT (OUTPATIENT)
Dept: DERMATOLOGY | Age: 63
End: 2018-06-01

## 2018-06-01 DIAGNOSIS — L57.8 PHOTOAGING OF SKIN: ICD-10-CM

## 2018-06-01 DIAGNOSIS — D22.9 MULTIPLE BENIGN MELANOCYTIC NEVI: ICD-10-CM

## 2018-06-01 DIAGNOSIS — L82.1 SEBORRHEIC KERATOSES: ICD-10-CM

## 2018-06-01 DIAGNOSIS — Z87.2 HISTORY OF ACTINIC KERATOSIS: ICD-10-CM

## 2018-06-01 DIAGNOSIS — L57.0 ACTINIC KERATOSIS: Primary | ICD-10-CM

## 2018-06-01 PROCEDURE — 99213 OFFICE O/P EST LOW 20 MIN: CPT | Performed by: DERMATOLOGY

## 2018-06-01 PROCEDURE — 17000 DESTRUCT PREMALG LESION: CPT | Performed by: DERMATOLOGY

## 2018-06-15 ENCOUNTER — PROCEDURE VISIT (OUTPATIENT)
Dept: DERMATOLOGY | Age: 63
End: 2018-06-15

## 2018-06-15 DIAGNOSIS — L82.1 SEBORRHEIC KERATOSES: ICD-10-CM

## 2018-06-15 DIAGNOSIS — L57.8 PHOTOAGING OF SKIN: Primary | ICD-10-CM

## 2018-06-15 PROCEDURE — DM00730 PR DERM ONLY VI PEEL WITH PRECISION: Performed by: DERMATOLOGY

## 2018-08-10 ENCOUNTER — PROCEDURE VISIT (OUTPATIENT)
Dept: DERMATOLOGY | Age: 63
End: 2018-08-10

## 2018-08-10 DIAGNOSIS — L57.8 PHOTOAGING OF SKIN: Primary | ICD-10-CM

## 2018-08-10 DIAGNOSIS — L29.9 PRURITUS: ICD-10-CM

## 2018-08-10 DIAGNOSIS — L82.1 SEBORRHEIC KERATOSES: ICD-10-CM

## 2018-08-10 PROCEDURE — DM00730 PR DERM ONLY VI PEEL WITH PRECISION: Performed by: DERMATOLOGY

## 2018-08-10 NOTE — PROGRESS NOTES
[facility]) Dermatology  [place], [place], [address]     Tommy De Jesus  1955    70 y.o. female     Date of Visit: 8/10/2018    Chief Complaint:   Chief Complaint   Patient presents with    Procedure     vipeel - 2nd treatment - pt states that cream that came with kit did not help with itching        History of Present Illness:  Tommy De Jesus is a 70 y.o. female who presents with the chief complaint of follow up for the followin. Presents today for VI chemical peel to improve signs of photoaging. Verbalized understanding of cosmetic fee associated with peel. Expresses no concerns prior to procedure. States she had some moderate pruritus during peeling to her entire face that started on day 2-3 and lasted for 2-3 days. She did not think the hydrocodone 1% cream helps with the itching. Noticed improvement to skin after one ViPeel and has received complements by friends. 2.  Continues to have seborrheic keratoses to her face diffusely. Thinks some SKs to cheeks have flattened mildly. She is interested in cosmetic cryotherapy to resolve the current SKs once her chemical peel series has completed. Review of Systems:  Constitutional: Reports general sense of well-being   Skin: No new or changing moles, no history of keloids or hypertrophic scars. Heme: No abnormal bruising or bleeding. Past Medical History, Family History, Surgical History, Medications and Allergies reviewed. Family History   Problem Relation Age of Onset    Alcohol Abuse Mother     Colon Cancer Father         mets to lung    Cancer Father         skin cancer-unknown type     Past Medical History:   Diagnosis Date    Breast cancer, stage 2 ([address]     dx , S/P rt. breast lumpectomy.  S/P chemo/radiation    Colorectal polyps     DVT of upper extremity (deep vein thrombosis) ([address]     Left Arm secondary to port for chemo    Gastroesophageal reflux disease 2017    Gestational diabetes

## 2018-09-26 PROBLEM — Z00.00 ANNUAL PHYSICAL EXAM: Status: RESOLVED | Noted: 2017-08-17 | Resolved: 2018-09-26

## 2018-09-26 PROBLEM — Z00.00 HEALTH CARE MAINTENANCE: Status: RESOLVED | Noted: 2017-04-17 | Resolved: 2018-09-26

## 2025-08-18 ENCOUNTER — OFFICE VISIT (OUTPATIENT)
Age: 70
End: 2025-08-18

## 2025-08-18 VITALS
BODY MASS INDEX: 33.97 KG/M2 | SYSTOLIC BLOOD PRESSURE: 138 MMHG | HEIGHT: 67 IN | TEMPERATURE: 97.5 F | DIASTOLIC BLOOD PRESSURE: 80 MMHG | OXYGEN SATURATION: 98 % | WEIGHT: 216.4 LBS

## 2025-08-18 DIAGNOSIS — T63.441A BEE STING, ACCIDENTAL OR UNINTENTIONAL, INITIAL ENCOUNTER: Primary | ICD-10-CM

## 2025-08-18 RX ORDER — IBUPROFEN 800 MG/1
800 TABLET, FILM COATED ORAL 3 TIMES DAILY PRN
Qty: 21 TABLET | Refills: 0 | Status: SHIPPED | OUTPATIENT
Start: 2025-08-18 | End: 2025-08-25

## 2025-08-18 RX ORDER — PREDNISONE 10 MG/1
TABLET ORAL
Qty: 21 TABLET | Refills: 0 | Status: SHIPPED | OUTPATIENT
Start: 2025-08-18

## 2025-08-18 RX ORDER — LORATADINE 10 MG/1
TABLET ORAL EVERY 24 HOURS
COMMUNITY

## 2025-08-18 RX ORDER — DIPHENHYDRAMINE HYDROCHLORIDE 50 MG/ML
50 INJECTION, SOLUTION INTRAMUSCULAR; INTRAVENOUS ONCE
Status: COMPLETED | OUTPATIENT
Start: 2025-08-18 | End: 2025-08-18

## 2025-08-18 RX ADMIN — DIPHENHYDRAMINE HYDROCHLORIDE 50 MG: 50 INJECTION, SOLUTION INTRAMUSCULAR; INTRAVENOUS at 16:29

## 2025-08-24 ENCOUNTER — OFFICE VISIT (OUTPATIENT)
Age: 70
End: 2025-08-24

## 2025-08-24 VITALS
WEIGHT: 217.8 LBS | OXYGEN SATURATION: 95 % | HEIGHT: 66 IN | BODY MASS INDEX: 35 KG/M2 | SYSTOLIC BLOOD PRESSURE: 115 MMHG | DIASTOLIC BLOOD PRESSURE: 75 MMHG | HEART RATE: 102 BPM

## 2025-08-24 DIAGNOSIS — S60.419A ABRASION OF FINGER OF RIGHT HAND, INITIAL ENCOUNTER: Primary | ICD-10-CM

## 2025-08-24 RX ORDER — SULFAMETHOXAZOLE AND TRIMETHOPRIM 800; 160 MG/1; MG/1
1 TABLET ORAL 2 TIMES DAILY
Qty: 20 TABLET | Refills: 0 | Status: SHIPPED | OUTPATIENT
Start: 2025-08-24 | End: 2025-09-03